# Patient Record
Sex: MALE | Race: WHITE
[De-identification: names, ages, dates, MRNs, and addresses within clinical notes are randomized per-mention and may not be internally consistent; named-entity substitution may affect disease eponyms.]

---

## 2019-08-05 ENCOUNTER — HOSPITAL ENCOUNTER (INPATIENT)
Dept: HOSPITAL 72 - EMR | Age: 44
LOS: 1 days | Discharge: HOME | DRG: 342 | End: 2019-08-06
Payer: COMMERCIAL

## 2019-08-05 VITALS — BODY MASS INDEX: 32.07 KG/M2 | HEIGHT: 70 IN | WEIGHT: 224 LBS

## 2019-08-05 VITALS — DIASTOLIC BLOOD PRESSURE: 63 MMHG | SYSTOLIC BLOOD PRESSURE: 113 MMHG

## 2019-08-05 VITALS — SYSTOLIC BLOOD PRESSURE: 111 MMHG | DIASTOLIC BLOOD PRESSURE: 62 MMHG

## 2019-08-05 VITALS — DIASTOLIC BLOOD PRESSURE: 72 MMHG | SYSTOLIC BLOOD PRESSURE: 131 MMHG

## 2019-08-05 VITALS — DIASTOLIC BLOOD PRESSURE: 72 MMHG | SYSTOLIC BLOOD PRESSURE: 125 MMHG

## 2019-08-05 VITALS — SYSTOLIC BLOOD PRESSURE: 118 MMHG | DIASTOLIC BLOOD PRESSURE: 67 MMHG

## 2019-08-05 VITALS — DIASTOLIC BLOOD PRESSURE: 78 MMHG | SYSTOLIC BLOOD PRESSURE: 126 MMHG

## 2019-08-05 VITALS — DIASTOLIC BLOOD PRESSURE: 70 MMHG | SYSTOLIC BLOOD PRESSURE: 117 MMHG

## 2019-08-05 VITALS — DIASTOLIC BLOOD PRESSURE: 69 MMHG | SYSTOLIC BLOOD PRESSURE: 124 MMHG

## 2019-08-05 DIAGNOSIS — N18.9: ICD-10-CM

## 2019-08-05 DIAGNOSIS — K35.891: Primary | ICD-10-CM

## 2019-08-05 DIAGNOSIS — E78.5: ICD-10-CM

## 2019-08-05 DIAGNOSIS — I12.9: ICD-10-CM

## 2019-08-05 DIAGNOSIS — K42.0: ICD-10-CM

## 2019-08-05 LAB
ADD MANUAL DIFF: YES
ALBUMIN SERPL-MCNC: 3.9 G/DL (ref 3.4–5)
ALBUMIN/GLOB SERPL: 0.9 {RATIO} (ref 1–2.7)
ALP SERPL-CCNC: 85 U/L (ref 46–116)
ALT SERPL-CCNC: 29 U/L (ref 12–78)
ANION GAP SERPL CALC-SCNC: 10 MMOL/L (ref 5–15)
APPEARANCE UR: CLEAR
APTT BLD: 30 SEC (ref 23–33)
APTT PPP: YELLOW S
AST SERPL-CCNC: 23 U/L (ref 15–37)
BILIRUB DIRECT SERPL-MCNC: 0.2 MG/DL (ref 0–0.3)
BILIRUB SERPL-MCNC: 1.2 MG/DL (ref 0.2–1)
BUN SERPL-MCNC: 15 MG/DL (ref 7–18)
CALCIUM SERPL-MCNC: 9.5 MG/DL (ref 8.5–10.1)
CHLORIDE SERPL-SCNC: 100 MMOL/L (ref 98–107)
CO2 SERPL-SCNC: 30 MMOL/L (ref 21–32)
CREAT SERPL-MCNC: 1.6 MG/DL (ref 0.55–1.3)
ERYTHROCYTE [DISTWIDTH] IN BLOOD BY AUTOMATED COUNT: 11.4 % (ref 11.6–14.8)
GLOBULIN SER-MCNC: 4.2 G/DL
GLUCOSE UR STRIP-MCNC: NEGATIVE MG/DL
HCT VFR BLD CALC: 47.9 % (ref 42–52)
HGB BLD-MCNC: 16.2 G/DL (ref 14.2–18)
INR PPP: 1 (ref 0.9–1.1)
KETONES UR QL STRIP: NEGATIVE
LEUKOCYTE ESTERASE UR QL STRIP: NEGATIVE
MCV RBC AUTO: 91 FL (ref 80–99)
NITRITE UR QL STRIP: NEGATIVE
PH UR STRIP: 5 [PH] (ref 4.5–8)
PLATELET # BLD: 237 K/UL (ref 150–450)
POTASSIUM SERPL-SCNC: 3.8 MMOL/L (ref 3.5–5.1)
PROT UR QL STRIP: (no result)
RBC # BLD AUTO: 5.26 M/UL (ref 4.7–6.1)
SODIUM SERPL-SCNC: 140 MMOL/L (ref 136–145)
SP GR UR STRIP: 1.02 (ref 1–1.03)
UROBILINOGEN UR-MCNC: 1 MG/DL (ref 0–1)
WBC # BLD AUTO: 24.3 K/UL (ref 4.8–10.8)

## 2019-08-05 PROCEDURE — 80048 BASIC METABOLIC PNL TOTAL CA: CPT

## 2019-08-05 PROCEDURE — 85007 BL SMEAR W/DIFF WBC COUNT: CPT

## 2019-08-05 PROCEDURE — 81003 URINALYSIS AUTO W/O SCOPE: CPT

## 2019-08-05 PROCEDURE — 96375 TX/PRO/DX INJ NEW DRUG ADDON: CPT

## 2019-08-05 PROCEDURE — 86900 BLOOD TYPING SEROLOGIC ABO: CPT

## 2019-08-05 PROCEDURE — 96365 THER/PROPH/DIAG IV INF INIT: CPT

## 2019-08-05 PROCEDURE — 82248 BILIRUBIN DIRECT: CPT

## 2019-08-05 PROCEDURE — 94150 VITAL CAPACITY TEST: CPT

## 2019-08-05 PROCEDURE — 80053 COMPREHEN METABOLIC PANEL: CPT

## 2019-08-05 PROCEDURE — 99285 EMERGENCY DEPT VISIT HI MDM: CPT

## 2019-08-05 PROCEDURE — 86850 RBC ANTIBODY SCREEN: CPT

## 2019-08-05 PROCEDURE — 83690 ASSAY OF LIPASE: CPT

## 2019-08-05 PROCEDURE — 84484 ASSAY OF TROPONIN QUANT: CPT

## 2019-08-05 PROCEDURE — 74177 CT ABD & PELVIS W/CONTRAST: CPT

## 2019-08-05 PROCEDURE — 36415 COLL VENOUS BLD VENIPUNCTURE: CPT

## 2019-08-05 PROCEDURE — 85025 COMPLETE CBC W/AUTO DIFF WBC: CPT

## 2019-08-05 PROCEDURE — 86901 BLOOD TYPING SEROLOGIC RH(D): CPT

## 2019-08-05 PROCEDURE — 85610 PROTHROMBIN TIME: CPT

## 2019-08-05 PROCEDURE — 85730 THROMBOPLASTIN TIME PARTIAL: CPT

## 2019-08-05 PROCEDURE — 93005 ELECTROCARDIOGRAM TRACING: CPT

## 2019-08-05 PROCEDURE — 94003 VENT MGMT INPAT SUBQ DAY: CPT

## 2019-08-05 RX ADMIN — DEXTROSE MONOHYDRATE SCH MLS/HR: 50 INJECTION, SOLUTION INTRAVENOUS at 23:52

## 2019-08-05 NOTE — NUR
ED Nurse Note:



pt stated he takes BP meds and HLD meds but does not recall the names and 
dosages.

## 2019-08-05 NOTE — CONSULTATION
History of Present Illness


General


Date patient seen:  Aug 5, 2019


Reason for Hospitalization:  Abdominal Pain





Present Illness


HPI


This is a very pleasant 43-year-old male who presented to the emerge department 

[facility] complaining of worsening abdominal pain.  Patient states 

that approximately 2 days ago he began to develop some acute lower abdominal 

pain.  Initially thought it could potentially be kidney stones as he has had 

them in the past and tried home remedy with pain medication but pain progressed 

and continued to worsen and localized in the right lower quadrant.  Pain 

associated with nausea and nonbilious nonbloody emesis.  Pain described as 10 

out of 10 out of max sharp right lower quadrant pain which is consistently 

approximately 6 unless movement or palpation making it worse.  In emergency 

department identified to have a leukocytosis.  CT scan consistent with acute 

appendicitis.  Surgery called to evaluate.  Patient seen, patient [place], chart 

reviewed.


Allergies:  


Coded Allergies:  


     No Known Allergies (Unverified , 8/5/19)





Patient History


History Provided By:  Patient, Medical Record, PMD


Healthcare decision maker





Resuscitation status





Advanced Directive on File








Past Medical/Surgical History


Past Medical/Surgical History:  


(1) Acute abdominal pain


(2) Renal cyst


(3) Appendicitis, acute


(4) Renal stones


(5) Left inguinal hernia





Review of Systems


Review of Symptoms


General ROS: no weight loss or fever


Psychological ROS: no depression or mood changes, no memory loss


Ophthalmic ROS: no visual changes or eye irritation


ENT ROS: no nasal congestion, hearing loss, dizziness


Allergy and Immunology ROS: no allergic symptoms or urticaria


Hematological and Lymphatic ROS: no swollen glands, unusual bleeding or bruising


Endocrine ROS: no polyuria, polydipsia, weight changes, temperature intolerance


Respiratory ROS: no cough, shortness of breath, or wheezing


Cardiovascular ROS: no chest pain or dyspnea on exertion


Gastrointestinal ROS: abdominal pain, no bright red blood in stool.


Musculoskeletal ROS: no myalgias or arthralgias


Neurological ROS: no TIA or stroke symptoms


Dermatological ROS: no new or changing skin lesions, rashes or pruritis





Physical Exam


Physical Exam


General appearance:  alert, cooperative, no distress, appears stated age


Head:  Normocephalic, without obvious abnormality, atraumatic


Eyes:  conjunctivae/corneas clear. PERRL, EOM's intact. Fundi benign


Throat:  Lips, mucosa, and tongue normal. Teeth and gums normal


Neck:  supple, symmetrical, trachea midline, no adenopathy, thyroid: not 

enlarged, symmetric, no tenderness/mass/nodules, no carotid bruit and no JVD


Lungs:  clear to auscultation bilaterally


Heart:  regular rate and rhythm, S1, S2 normal, no murmur, click, rub or gallop


Abdomen:  soft, RLQ-tender. Bowel sounds normal. No masses,  no organomegaly; 

ventral umbilical hernia with incarcerated fat


Extremities:  extremities normal, atraumatic, no cyanosis or edema


Pulses:  2+ and symmetric


Skin:  Skin color, texture, turgor normal. No rashes or lesions


Neurologic:  Grossly normal





Last 24 Hour Vital Signs








  Date Time  Temp Pulse Resp B/P (MAP) Pulse Ox O2 Delivery O2 Flow Rate FiO2


 


8/5/19 15:27 98.8       


 


8/5/19 14:55 98.8       


 


8/5/19 13:37 98.8 81 20 126/78 98 Room Air  


 


8/5/19 13:37  81 20   Room Air  


 


8/5/19 13:22 98.8 81 20 126/78 (94) 98 Room Air  











Laboratory Tests








Test


  8/5/19


13:40 8/5/19


14:00


 


Urine Color Yellow   


 


Urine Appearance Clear   


 


Urine pH 5 (4.5-8.0)   


 


Urine Specific Gravity


  1.020


(1.005-1.035) 


 


 


Urine Protein


  1+ (NEGATIVE)


H 


 


 


Urine Glucose (UA)


  Negative


(NEGATIVE) 


 


 


Urine Ketones


  Negative


(NEGATIVE) 


 


 


Urine Blood


  Negative


(NEGATIVE) 


 


 


Urine Nitrite


  Negative


(NEGATIVE) 


 


 


Urine Bilirubin


  Negative


(NEGATIVE) 


 


 


Urine Urobilinogen


  1 MG/DL


(0.0-1.0)  H 


 


 


Urine Leukocyte Esterase


  Negative


(NEGATIVE) 


 


 


Urine RBC


  0 /HPF (0 - 0)


  


 


 


Urine WBC


  0-2 /HPF (0 -


0) 


 


 


Urine Squamous Epithelial


Cells Occasional


/LPF 


 


 


Urine Amorphous Sediment


  Few /LPF


(NONE)  H 


 


 


Urine Bacteria


  Occasional


/HPF (NONE) 


 


 


Urine Mucus


  Few /LPF


(NONE/OCC)  H 


 


 


White Blood Count


  


  24.3 K/UL


(4.8-10.8)  *H


 


Red Blood Count


  


  5.26 M/UL


(4.70-6.10)


 


Hemoglobin


  


  16.2 G/DL


(14.2-18.0)


 


Hematocrit


  


  47.9 %


(42.0-52.0)


 


Mean Corpuscular Volume  91 FL (80-99)  


 


Mean Corpuscular Hemoglobin


  


  30.7 PG


(27.0-31.0)


 


Mean Corpuscular Hemoglobin


Concent 


  33.7 G/DL


(32.0-36.0)


 


Red Cell Distribution Width


  


  11.4 %


(11.6-14.8)  L


 


Platelet Count


  


  237 K/UL


(150-450)


 


Mean Platelet Volume


  


  8.1 FL


(6.5-10.1)


 


Neutrophils (%) (Auto)


  


  % (45.0-75.0)


 


 


Lymphocytes (%) (Auto)


  


  % (20.0-45.0)


 


 


Monocytes (%) (Auto)   % (1.0-10.0)  


 


Eosinophils (%) (Auto)   % (0.0-3.0)  


 


Basophils (%) (Auto)   % (0.0-2.0)  


 


Differential Total Cells


Counted 


  100  


 


 


Neutrophils % (Manual)  92 % (45-75)  H


 


Lymphocytes % (Manual)  4 % (20-45)  L


 


Monocytes % (Manual)  4 % (1-10)  


 


Eosinophils % (Manual)  0 % (0-3)  


 


Basophils % (Manual)  0 % (0-2)  


 


Band Neutrophils  0 % (0-8)  


 


Platelet Estimate  Adequate  


 


Platelet Morphology  Normal  


 


Red Blood Cell Morphology  Normal  


 


Prothrombin Time


  


  10.6 SEC


(9.30-11.50)


 


Prothromb Time International


Ratio 


  1.0 (0.9-1.1)  


 


 


Activated Partial


Thromboplast Time 


  30 SEC (23-33)


 


 


Sodium Level


  


  140 MMOL/L


(136-145)


 


Potassium Level


  


  3.8 MMOL/L


(3.5-5.1)


 


Chloride Level


  


  100 MMOL/L


()


 


Carbon Dioxide Level


  


  30 MMOL/L


(21-32)


 


Anion Gap


  


  10 mmol/L


(5-15)


 


Blood Urea Nitrogen


  


  15 mg/dL


(7-18)


 


Creatinine


  


  1.6 MG/DL


(0.55-1.30)  H


 


Estimat Glomerular Filtration


Rate 


  47.2 mL/min


(>60)


 


Glucose Level


  


  97 MG/DL


()


 


Calcium Level


  


  9.5 MG/DL


(8.5-10.1)


 


Total Bilirubin


  


  1.2 MG/DL


(0.2-1.0)  H


 


Direct Bilirubin


  


  0.2 MG/DL


(0.0-0.3)


 


Aspartate Amino Transf


(AST/SGOT) 


  23 U/L (15-37)


 


 


Alanine Aminotransferase


(ALT/SGPT) 


  29 U/L (12-78)


 


 


Alkaline Phosphatase


  


  85 U/L


()


 


Troponin I


  


  0.000 ng/mL


(0.000-0.056)


 


Total Protein


  


  8.1 G/DL


(6.4-8.2)


 


Albumin


  


  3.9 G/DL


(3.4-5.0)


 


Globulin  4.2 g/dL  


 


Albumin/Globulin Ratio


  


  0.9 (1.0-2.7)


L


 


Lipase


  


  119 U/L


()








Height (Feet):  5


Height (Inches):  10.00


Weight (Pounds):  224


Medications





Current Medications








 Medications


  (Trade)  Dose


 Ordered  Sig/Catherine


 Route


 PRN Reason  Start Time


 Stop Time Status Last Admin


Dose Admin


 


 Iopamidol


  (Isovue-300


 100ml)  100 ml  NOW  PRN


 INJ


 Radiology Procedure  8/5/19 13:45


     


 











Assessment/Plan


Problem List:  


(1) Ventral hernia


ICD Codes:  K43.9 - Ventral hernia without obstruction or gangrene


SNOMED:  073520329


(2) Acute abdominal pain


Assessment & Plan:  Findings: There is evidence of acute appendicitis with 

dilatation of the mid to


distal portion of the appendix with ill-defined wall and periappendiceal


inflammation. There is no abscess. There is no free fluid. The appendix measures


about 13 to 14 mm in diameter.


ICD Codes:  R10.9 - Unspecified abdominal pain


SNOMED:  769197890


(3) Appendicitis, acute


Assessment & Plan:  This is a 43-year-old male with acute uncomplicated 

appendicitis.  Afebrile, hemodynamically stable, leukocytosis, CT scan 

consistent with acute appendicitis nonperforated.  On examination patient with 

focal right lower quadrant tenderness and rebound guarding.  Furthermore on 

examination patient with a ventral umbilical hernia that has incarcerated fat 

identified.  Ventral hernia otherwise asymptomatic.  Surgery indicating 

recommended.  Risk medicine alternatives surgery discussed patient in detail 

questions and consented.  In discussion with patient about operative plan 

decision was made to utilize the ventral hernia as a lap scopic port site and 

an closure repair of the hernia without mesh.  Patient expressed understanding 

and consented to both procedures.





N.p.o.


IV fluids


IV Abx


Consent


to OR for lap vs open appy


ICD Codes:  K35.80 - Unspecified acute appendicitis


SNOMED:  99763485











Zane Rose Aug 5, 2019 17:16

## 2019-08-05 NOTE — NUR
NURSE NOTES:



Received handoff report from PACU RN. Patient is calm and resting comfortably, VSS on 2L O2 
NS. Ventral abdominal steri stips C/D/I. Abdomen soft and round with hypoactive bowel sounds 
in all 4 quadrants. Lungs clear bilaterally. Patient denies SOB. Peripheral IV intact and 
patent.

## 2019-08-05 NOTE — DIAGNOSTIC IMAGING REPORT
Indication: Abdominal pain in the right lower quadrant.

 

Technique: Continuous helical transaxial imaging of the abdomen and pelvis was

obtained from the lung bases to the pubic symphysis during intravenous contrast

administration. Coronal 2-D reformats were also obtained. Study obtained in a Siemens

sensation 64 slice CT.  Automatic Exposure Control was utilized.

 

Total Dose length Product (DLP):  1007.52 mGycm

 

CT Dose Index Volume (CTDIvol):   18.4 mGy

 

Comparison: None

 

Findings: There is evidence of acute appendicitis with dilatation of the mid to

distal portion of the appendix with ill-defined wall and periappendiceal

inflammation. There is no abscess. There is no free fluid. The appendix measures

about 13 to 14 mm in diameter. Bowel gas pattern is nonobstructive. There is a small

left inguinal hernia containing fat. There is no hydronephrosis demonstrated but

there are a few punctate nonobstructive stones within both kidneys. Hypodensities

within the kidneys also noted may be small cysts. Some are too small to characterize

adequately on this examination. Lung bases are clear. The liver, gallbladder,

pancreas and spleen appear unremarkable. There is no adrenal mass.

 

IMPRESSION:

 

Acute appendicitis. No evidence of rupture or abscess.

 

Nonobstructive tiny stones within both kidneys.

 

Suggestion of tiny bilateral renal cysts. Some are too small to characterize

 

Small left inguinal hernia containing fat.

 

Critical value communication.  Findings were discussed via telephone with Dr. Zhang

in the emergency department at 3:35 PM, 8/5/2019.

 

 

 

 

The CT scanner at [facility] is accredited by the American College of

Radiology and the scans are performed using dose optimization techniques as

appropriate to a performed exam including Automatic Exposure control.

## 2019-08-05 NOTE — ANETHESIA PREOPERATIVE EVAL
Anesthesia Pre-op PMH/ROS


General


Date of Evaluation:  Aug 5, 2019


Time of Evaluation:  18:25


Anesthesiologist:  jalen


ASA Score:  ASA 2


Mallampati Score


Class I : Soft palate, uvula, fauces, pillars visible


Class II: Soft palate, uvula, fauces visible


Class III: Soft palate, base of uvula visible


Class IV: Only hard plate visible


Mallampati Classification:  Class III


Surgeon:  medhat


Diagnosis:  appendictis


Surgical Procedure:  lap appy


Social History:  smoking


Family History:  no anesthesia problems


Allergies:  


Coded Allergies:  


     No Known Allergies (Unverified , 8/5/19)


Medications:  see eMAR


Patient NPO?:  Yes


NPO Date:  Aug 5, 2019


NPO Time:  00:01





Past Medical History


Cardiovascular:  Denies: HTN, CAD, MI, valve dz, arrhythmia, other


Pulmonary:  Denies: asthma, COPD, MIKAEL, other


Gastrointestinal/Genitourinary:  Reports: GERD


Neurologic/Psychiatric:  Denies: dementia, CVA, depression/anxiety, TIA, other


HEENT:  Denies: cataract (L), cataract (R), glaucoma, [place] (L), [place] (R), other


Hematology/Immune:  Denies: anemia, DVT, bleeding disorder, other


Musculoskeletal/Integumentary:  Denies: OA, RA, DJD, DDD, edema, other





Anesthesia Pre-op Phys. Exam


Physician Exam





Last Vital Signs








  Date Time  Temp Pulse Resp B/P (MAP) Pulse Ox O2 Delivery O2 Flow Rate FiO2


 


8/5/19 18:06 98.8 78 20 122/74 98 Room Air  








Constitutional:  NAD


Neurologic:  CN 2-12 intact


Cardiovascular:  RRR


Respiratory:  CTA


Gastrointestinal:  S/NT/ND





Airway Exam


Mallampati Score:  Class III


MO:  full


Neck:  thick


ROM:  full


Dentures:  no upper, no lower





Anesthesia Pre-op A/P


Labs





Hematology








Test


  8/5/19


14:00


 


White Blood Count


  24.3 K/UL


(4.8-10.8)  *H


 


Red Blood Count


  5.26 M/UL


(4.70-6.10)


 


Hemoglobin


  16.2 G/DL


(14.2-18.0)


 


Hematocrit


  47.9 %


(42.0-52.0)


 


Mean Corpuscular Volume 91 FL (80-99)  


 


Mean Corpuscular Hemoglobin


  30.7 PG


(27.0-31.0)


 


Mean Corpuscular Hemoglobin


Concent 33.7 G/DL


(32.0-36.0)


 


Red Cell Distribution Width


  11.4 %


(11.6-14.8)  L


 


Platelet Count


  237 K/UL


(150-450)


 


Mean Platelet Volume


  8.1 FL


(6.5-10.1)


 


Neutrophils (%) (Auto)


  % (45.0-75.0)


 


 


Lymphocytes (%) (Auto)


  % (20.0-45.0)


 


 


Monocytes (%) (Auto)  % (1.0-10.0)  


 


Eosinophils (%) (Auto)  % (0.0-3.0)  


 


Basophils (%) (Auto)  % (0.0-2.0)  


 


Differential Total Cells


Counted 100  


 


 


Neutrophils % (Manual) 92 % (45-75)  H


 


Lymphocytes % (Manual) 4 % (20-45)  L


 


Monocytes % (Manual) 4 % (1-10)  


 


Eosinophils % (Manual) 0 % (0-3)  


 


Basophils % (Manual) 0 % (0-2)  


 


Band Neutrophils 0 % (0-8)  


 


Platelet Estimate Adequate  


 


Platelet Morphology Normal  


 


Red Blood Cell Morphology Normal  








Coagulation








Test


  8/5/19


14:00


 


Prothrombin Time


  10.6 SEC


(9.30-11.50)


 


Prothromb Time International


Ratio 1.0 (0.9-1.1)  


 


 


Activated Partial


Thromboplast Time 30 SEC (23-33)


 








Chemistry








Test


  8/5/19


14:00


 


Sodium Level


  140 MMOL/L


(136-145)


 


Potassium Level


  3.8 MMOL/L


(3.5-5.1)


 


Chloride Level


  100 MMOL/L


()


 


Carbon Dioxide Level


  30 MMOL/L


(21-32)


 


Anion Gap


  10 mmol/L


(5-15)


 


Blood Urea Nitrogen


  15 mg/dL


(7-18)


 


Creatinine


  1.6 MG/DL


(0.55-1.30)  H


 


Estimat Glomerular Filtration


Rate 47.2 mL/min


(>60)


 


Glucose Level


  97 MG/DL


()


 


Calcium Level


  9.5 MG/DL


(8.5-10.1)


 


Total Bilirubin


  1.2 MG/DL


(0.2-1.0)  H


 


Direct Bilirubin


  0.2 MG/DL


(0.0-0.3)


 


Aspartate Amino Transf


(AST/SGOT) 23 U/L (15-37)


 


 


Alanine Aminotransferase


(ALT/SGPT) 29 U/L (12-78)


 


 


Alkaline Phosphatase


  85 U/L


()


 


Troponin I


  0.000 ng/mL


(0.000-0.056)


 


Total Protein


  8.1 G/DL


(6.4-8.2)


 


Albumin


  3.9 G/DL


(3.4-5.0)


 


Globulin 4.2 g/dL  


 


Albumin/Globulin Ratio


  0.9 (1.0-2.7)


L


 


Lipase


  119 U/L


()











Risk Assessment & Plan


Assessment:


denies changes in health


Plan:


general


Status Change Before Surgery:  No





Pre-Antibiotics


Drug:  none











Tatiana Inman CRNA Aug 5, 2019 19:29

## 2019-08-05 NOTE — BRIEF OPERATIVE NOTE
Immediate Post Operative Note


Operative Note


Pre-op Diagnosis:


acute appendicitis


fat incarcerated ventral umbilical hernia


Procedure:


lap appy 


ventral hernia repair


Post-op Diagnosis:  same as pre-op


Surgeon:  devendra


Anesthesiologist:  cosme


Anesthesia:  general, local


Specimen:  yes


Complications:  none


Condition:  stable


Fluids:  see records


Estimated Blood Loss:  minimal


Drains:  none


Implant(s) used?:  No











Zane Rose Aug 5, 2019 19:23

## 2019-08-05 NOTE — PRE-PROCEDURE NOTE/ATTESTATION
Pre-Procedure Note/Attestation


Complete Prior to Procedure


Planned Procedure:  not applicable


Procedure Narrative:


1. laparoscopic appendectomy possible open


2. ventral umbilical hernia repair





Indications for Procedure


Pre-Operative Diagnosis:


acute appendicitis


fat incarcerated ventral umbilical hernia





Attestation


I attest that I discussed the nature of the procedure; its benefits; risks and 

complications; and alternatives (and the risks and benefits of such alternatives

), prior to the procedure, with the patient (or the patient's legal 

representative).





I attest that, if there was a reasonable possibility of needing a blood 

transfusion, the patient (or the patient's legal representative) was given the 

[place] [employer] standardized written summary, pursuant 

to the [place] Blood Safety Act ([place] Health and Safety Code # 1645, as 

amended).





I attest that I re-evaluated the patient just prior to the surgery and that 

there has been no change in the patient's H&P, except as documented below:











Zane Rose Aug 5, 2019 17:20

## 2019-08-05 NOTE — NUR
ED Nurse Note:



Patient walked in to ER c/o abdominal pain 10/10, N/V for 4 days. Patient alert 
and oriented x4 and ambulatory. skin clean and intact. calm and cooperative. pt 
is standing at this moment due to sitting makes pain worse. pt is in gown and 
on cardiac monitor.

## 2019-08-05 NOTE — IMMEDIATE POST-OP EVALUATION
Immediate Post-Op Evalulation


Immediate Post-Op Evalulation


Procedure:  LapAppendectomy


Date of Evaluation:  Aug 5, 2019


Time of Evaluation:  19:27


IV Fluids:  1000


Blood Pressure Systolic:  131


Blood Pressure Diastolic:  72


Pulse Rate:  69


Respiratory Rate:  14


O2 Sat by Pulse Oximetry:  98


Nausea:  No


Vomiting:  No


Complications


none


Patient Status:  awake, reacts, patent


Hydration Status:  adequate


Drug:  declined











Tatiana Inman CRNA Aug 5, 2019 19:28

## 2019-08-05 NOTE — EMERGENCY ROOM REPORT
History of Present Illness


General


Chief Complaint:  Abdominal Pain


Source:  Patient





Present Illness


HPI


Patient is a 43-year-old male presented after increased abdominal pain for 1 

day. Started sunday morning.    Patient reports of increased epigastric pain 

from both flanks.  He reports having some belt-like distribution.   patient was 

noted to have increased pain to the lower abdomen as well as the upper abdomen.

  He denies any alcohol use.  He reports having prior history of kidney stones.

  He denies any fever.  He does report having some episodes of vomiting.  He 

denies any diarrhea.  Reports having somewhat become shaky.He denies regular 

alcohol or marijuana use.  He denies prior surgery.  He does report his urine 

becoming more dark lately.Patient denies any hematuria.  He reports having 

normal bowel movements.Patient was noted to have last had water just prior to 

arrival.  Patient's last meal was at 1030 this morning.


Allergies:  


Coded Allergies:  


     No Known Allergies (Unverified , 8/5/19)





Patient History


Past Medical History:  see triage record, other - kidney stones


Reviewed Nursing Documentation:  PMH: Agreed; PSxH: Agreed





Nursing Documentation-PMH


Past Medical History:  No History, Except For


Hx Hypertension:  Yes





Review of Systems


All Other Systems:  negative except mentioned in HPI





Physical Exam





Vital Signs








  Date Time  Temp Pulse Resp B/P (MAP) Pulse Ox O2 Delivery O2 Flow Rate FiO2


 


8/5/19 13:22 98.8 81 20 126/78 (94) 98 Room Air  








Sp02 EP Interpretation:  reviewed, normal


General Appearance:  normal inspection, well appearing, no apparent distress, 

alert, GCS 15


Head:  atraumatic


ENT:  normal ENT inspection, hearing grossly normal, normal voice


Neck:  normal inspection, full range of motion, supple, no bony tend


Respiratory:  normal inspection, lungs clear, normal breath sounds, no 

respiratory distress, no retraction, no wheezing


Cardiovascular #1:  regular rate, rhythm, no edema


Gastrointestinal:  soft, tenderness - right lower abdomen


Genitourinary:  no CVA tenderness


Musculoskeletal:  normal inspection, back normal, normal range of motion


Neurologic:  normal inspection, alert, oriented x3, responsive, CNs III-XII nml 

as tested, speech normal


Psychiatric:  normal inspection, judgement/insight normal, mood/affect normal





Medical Decision Making


Diagnostic Impression:  


 Primary Impression:  


 Acute abdominal pain


 Additional Impressions:  


 Appendicitis, acute


 Renal stones


 Left inguinal hernia


 Renal cyst


ER Course


Patient presented for abdominal pain. Differential diagnoses included ischemic 

bowel, appendicitis, perforated viscus, abdominal aortic aneurysm, inferior 

myocardial infarction, viral gastroenteritis among others.  Because of 

complexity of patient's case laboratory testing and imaging studies were 

ordered.  Patient was noted to have some prior history of kidney stones.  

Patient's pain appears to be more localized to the right lower abdomen.  

Patient was noted to have some focal tenderness in that area.  CT of the 

abdomen pelvis was ordered due to the patient's increased pain.  Patient 

started on IV fluids.  Patient was noted to have elevated white blood count 24,

000.Patient's creatinine was also somewhat elevated.CT the abdomen pelvis read 

by radiology showed multiple nonobstructing renal stones as well as a 13 mm 

appendix with some mild periappendiceal inflammation consistent with 

appendicitis.Left inguinal hernia containing fat only.  Patient was endorsed to 

Dr. Jordan pending insurance authorization and final disposition.





Labs








Test


  8/5/19


13:40 8/5/19


14:00


 


Urine Color Yellow  


 


Urine Appearance Clear  


 


Urine pH 5 (4.5-8.0)  


 


Urine Specific Gravity


  1.020


(1.005-1.035) 


 


 


Urine Protein 1+ (NEGATIVE)  


 


Urine Glucose (UA)


  Negative


(NEGATIVE) 


 


 


Urine Ketones


  Negative


(NEGATIVE) 


 


 


Urine Blood


  Negative


(NEGATIVE) 


 


 


Urine Nitrite


  Negative


(NEGATIVE) 


 


 


Urine Bilirubin


  Negative


(NEGATIVE) 


 


 


Urine Urobilinogen


  1 MG/DL


(0.0-1.0) 


 


 


Urine Leukocyte Esterase


  Negative


(NEGATIVE) 


 


 


Urine RBC 0 /HPF (0 - 0)  


 


Urine WBC


  0-2 /HPF (0 -


0) 


 


 


Urine Squamous Epithelial


Cells Occasional


/LPF 


 


 


Urine Amorphous Sediment


  Few /LPF


(NONE) 


 


 


Urine Bacteria


  Occasional


/HPF (NONE) 


 


 


Urine Mucus


  Few /LPF


(NONE/OCC) 


 


 


White Blood Count


  


  24.3 K/UL


(4.8-10.8)


 


Red Blood Count


  


  5.26 M/UL


(4.70-6.10)


 


Hemoglobin


  


  16.2 G/DL


(14.2-18.0)


 


Hematocrit


  


  47.9 %


(42.0-52.0)


 


Mean Corpuscular Volume  91 FL (80-99) 


 


Mean Corpuscular Hemoglobin


  


  30.7 PG


(27.0-31.0)


 


Mean Corpuscular Hemoglobin


Concent 


  33.7 G/DL


(32.0-36.0)


 


Red Cell Distribution Width


  


  11.4 %


(11.6-14.8)


 


Platelet Count


  


  237 K/UL


(150-450)


 


Mean Platelet Volume


  


  8.1 FL


(6.5-10.1)


 


Neutrophils (%) (Auto)   % (45.0-75.0) 


 


Lymphocytes (%) (Auto)   % (20.0-45.0) 


 


Monocytes (%) (Auto)   % (1.0-10.0) 


 


Eosinophils (%) (Auto)   % (0.0-3.0) 


 


Basophils (%) (Auto)   % (0.0-2.0) 


 


Differential Total Cells


Counted 


  100 


 


 


Neutrophils % (Manual)  92 % (45-75) 


 


Lymphocytes % (Manual)  4 % (20-45) 


 


Monocytes % (Manual)  4 % (1-10) 


 


Eosinophils % (Manual)  0 % (0-3) 


 


Basophils % (Manual)  0 % (0-2) 


 


Band Neutrophils  0 % (0-8) 


 


Platelet Estimate  Adequate 


 


Platelet Morphology  Normal 


 


Red Blood Cell Morphology  Normal 


 


Prothrombin Time


  


  10.6 SEC


(9.30-11.50)


 


Prothromb Time International


Ratio 


  1.0 (0.9-1.1) 


 


 


Activated Partial


Thromboplast Time 


  30 SEC (23-33) 


 


 


Sodium Level


  


  140 MMOL/L


(136-145)


 


Potassium Level


  


  3.8 MMOL/L


(3.5-5.1)


 


Chloride Level


  


  100 MMOL/L


()


 


Carbon Dioxide Level


  


  30 MMOL/L


(21-32)


 


Anion Gap


  


  10 mmol/L


(5-15)


 


Blood Urea Nitrogen


  


  15 mg/dL


(7-18)


 


Creatinine


  


  1.6 MG/DL


(0.55-1.30)


 


Estimat Glomerular Filtration


Rate 


  47.2 mL/min


(>60)


 


Glucose Level


  


  97 MG/DL


()


 


Calcium Level


  


  9.5 MG/DL


(8.5-10.1)


 


Total Bilirubin


  


  1.2 MG/DL


(0.2-1.0)


 


Direct Bilirubin


  


  0.2 MG/DL


(0.0-0.3)


 


Aspartate Amino Transf


(AST/SGOT) 


  23 U/L (15-37) 


 


 


Alanine Aminotransferase


(ALT/SGPT) 


  29 U/L (12-78) 


 


 


Alkaline Phosphatase


  


  85 U/L


()


 


Total Protein


  


  8.1 G/DL


(6.4-8.2)


 


Albumin


  


  3.9 G/DL


(3.4-5.0)


 


Globulin  4.2 g/dL 


 


Albumin/Globulin Ratio  0.9 (1.0-2.7) 


 


Lipase


  


  119 U/L


()











Last Vital Signs








  Date Time  Temp Pulse Resp B/P (MAP) Pulse Ox O2 Delivery O2 Flow Rate FiO2


 


8/5/19 13:37 98.8 81 20 126/78 98 Room Air  








Status:  unchanged


Disposition:  ADMITTED AS INPATIENT


Condition:  Stable











Garfield Zhang MD Aug 5, 2019 13:44

## 2019-08-05 NOTE — EMERGENCY ROOM REPORT
Physical Exam





Vital Signs








  Date Time  Temp Pulse Resp B/P (MAP) Pulse Ox O2 Delivery O2 Flow Rate FiO2


 


8/5/19 13:22 98.8 81 20 126/78 (94) 98 Room Air  











Medical Decision Making


Diagnostic Impression:  


 Primary Impression:  


 Acute abdominal pain


 Additional Impressions:  


 Renal stones


 Renal cyst


 Appendicitis, acute


 Left inguinal hernia


ER Course


Assumed care from Dr. Zhang pending ultimate disposition.  Briefly, this is a 

43-year-old male who presented after 1 day of abdominal pain.  Found to have an 

acute appendicitis with significant elevation his white count.  He was given IV 

fluids, Zosyn and was made NPO and perforation for surgery.  At the time of 

signout we are awaiting ultimate disposition regarding transfer to [facility] versus keeping the patient here at our facility for surgery.  Decision 

was made to keep the patient in our facility.  Dr. Rose will take the 

patient to surgery.  Remains in stable condition and pain is controlled.





Laboratory Tests








Test


  8/5/19


13:40 8/5/19


14:00


 


Urine Color Yellow   


 


Urine Appearance Clear   


 


Urine pH 5 (4.5-8.0)   


 


Urine Specific Gravity


  1.020


(1.005-1.035) 


 


 


Urine Protein


  1+ (NEGATIVE)


H 


 


 


Urine Glucose (UA)


  Negative


(NEGATIVE) 


 


 


Urine Ketones


  Negative


(NEGATIVE) 


 


 


Urine Blood


  Negative


(NEGATIVE) 


 


 


Urine Nitrite


  Negative


(NEGATIVE) 


 


 


Urine Bilirubin


  Negative


(NEGATIVE) 


 


 


Urine Urobilinogen


  1 MG/DL


(0.0-1.0)  H 


 


 


Urine Leukocyte Esterase


  Negative


(NEGATIVE) 


 


 


Urine RBC


  0 /HPF (0 - 0)


  


 


 


Urine WBC


  0-2 /HPF (0 -


0) 


 


 


Urine Squamous Epithelial


Cells Occasional


/LPF 


 


 


Urine Amorphous Sediment


  Few /LPF


(NONE)  H 


 


 


Urine Bacteria


  Occasional


/HPF (NONE) 


 


 


Urine Mucus


  Few /LPF


(NONE/OCC)  H 


 


 


White Blood Count


  


  24.3 K/UL


(4.8-10.8)  *H


 


Red Blood Count


  


  5.26 M/UL


(4.70-6.10)


 


Hemoglobin


  


  16.2 G/DL


(14.2-18.0)


 


Hematocrit


  


  47.9 %


(42.0-52.0)


 


Mean Corpuscular Volume  91 FL (80-99)  


 


Mean Corpuscular Hemoglobin


  


  30.7 PG


(27.0-31.0)


 


Mean Corpuscular Hemoglobin


Concent 


  33.7 G/DL


(32.0-36.0)


 


Red Cell Distribution Width


  


  11.4 %


(11.6-14.8)  L


 


Platelet Count


  


  237 K/UL


(150-450)


 


Mean Platelet Volume


  


  8.1 FL


(6.5-10.1)


 


Neutrophils (%) (Auto)


  


  % (45.0-75.0)


 


 


Lymphocytes (%) (Auto)


  


  % (20.0-45.0)


 


 


Monocytes (%) (Auto)   % (1.0-10.0)  


 


Eosinophils (%) (Auto)   % (0.0-3.0)  


 


Basophils (%) (Auto)   % (0.0-2.0)  


 


Differential Total Cells


Counted 


  100  


 


 


Neutrophils % (Manual)  92 % (45-75)  H


 


Lymphocytes % (Manual)  4 % (20-45)  L


 


Monocytes % (Manual)  4 % (1-10)  


 


Eosinophils % (Manual)  0 % (0-3)  


 


Basophils % (Manual)  0 % (0-2)  


 


Band Neutrophils  0 % (0-8)  


 


Platelet Estimate  Adequate  


 


Platelet Morphology  Normal  


 


Red Blood Cell Morphology  Normal  


 


Prothrombin Time


  


  10.6 SEC


(9.30-11.50)


 


Prothromb Time International


Ratio 


  1.0 (0.9-1.1)  


 


 


Activated Partial


Thromboplast Time 


  30 SEC (23-33)


 


 


Sodium Level


  


  140 MMOL/L


(136-145)


 


Potassium Level


  


  3.8 MMOL/L


(3.5-5.1)


 


Chloride Level


  


  100 MMOL/L


()


 


Carbon Dioxide Level


  


  30 MMOL/L


(21-32)


 


Anion Gap


  


  10 mmol/L


(5-15)


 


Blood Urea Nitrogen


  


  15 mg/dL


(7-18)


 


Creatinine


  


  1.6 MG/DL


(0.55-1.30)  H


 


Estimat Glomerular Filtration


Rate 


  47.2 mL/min


(>60)


 


Glucose Level


  


  97 MG/DL


()


 


Calcium Level


  


  9.5 MG/DL


(8.5-10.1)


 


Total Bilirubin


  


  1.2 MG/DL


(0.2-1.0)  H


 


Direct Bilirubin


  


  0.2 MG/DL


(0.0-0.3)


 


Aspartate Amino Transf


(AST/SGOT) 


  23 U/L (15-37)


 


 


Alanine Aminotransferase


(ALT/SGPT) 


  29 U/L (12-78)


 


 


Alkaline Phosphatase


  


  85 U/L


()


 


Troponin I


  


  0.000 ng/mL


(0.000-0.056)


 


Total Protein


  


  8.1 G/DL


(6.4-8.2)


 


Albumin


  


  3.9 G/DL


(3.4-5.0)


 


Globulin  4.2 g/dL  


 


Albumin/Globulin Ratio


  


  0.9 (1.0-2.7)


L


 


Lipase


  


  119 U/L


()











Last Vital Signs








  Date Time  Temp Pulse Resp B/P (MAP) Pulse Ox O2 Delivery O2 Flow Rate FiO2


 


8/5/19 15:27 98.8       


 


8/5/19 13:37  81 20 126/78 98 Room Air  








Disposition:  ADMITTED AS INPATIENT


Condition:  Stable


Referrals:  


[facility],REFERRING (PCP)











Sourav Jordan MD Aug 5, 2019 17:11

## 2019-08-06 VITALS — SYSTOLIC BLOOD PRESSURE: 118 MMHG | DIASTOLIC BLOOD PRESSURE: 71 MMHG

## 2019-08-06 VITALS — SYSTOLIC BLOOD PRESSURE: 128 MMHG | DIASTOLIC BLOOD PRESSURE: 71 MMHG

## 2019-08-06 VITALS — SYSTOLIC BLOOD PRESSURE: 133 MMHG | DIASTOLIC BLOOD PRESSURE: 78 MMHG

## 2019-08-06 LAB
ADD MANUAL DIFF: YES
ANION GAP SERPL CALC-SCNC: 8 MMOL/L (ref 5–15)
BUN SERPL-MCNC: 14 MG/DL (ref 7–18)
CALCIUM SERPL-MCNC: 8.6 MG/DL (ref 8.5–10.1)
CHLORIDE SERPL-SCNC: 100 MMOL/L (ref 98–107)
CO2 SERPL-SCNC: 27 MMOL/L (ref 21–32)
CREAT SERPL-MCNC: 1.5 MG/DL (ref 0.55–1.3)
ERYTHROCYTE [DISTWIDTH] IN BLOOD BY AUTOMATED COUNT: 12 % (ref 11.6–14.8)
HCT VFR BLD CALC: 40.8 % (ref 42–52)
HGB BLD-MCNC: 14.2 G/DL (ref 14.2–18)
MCV RBC AUTO: 89 FL (ref 80–99)
PLATELET # BLD: 182 K/UL (ref 150–450)
POTASSIUM SERPL-SCNC: 3.7 MMOL/L (ref 3.5–5.1)
RBC # BLD AUTO: 4.58 M/UL (ref 4.7–6.1)
SODIUM SERPL-SCNC: 135 MMOL/L (ref 136–145)
WBC # BLD AUTO: 10.9 K/UL (ref 4.8–10.8)

## 2019-08-06 RX ADMIN — DEXTROSE MONOHYDRATE SCH MLS/HR: 50 INJECTION, SOLUTION INTRAVENOUS at 06:33

## 2019-08-06 NOTE — NUR
NURSE NOTES:





PATIENT REMAINS STABLE. SEEN BY SURGEON AND PMD. DISCHARGE ORDER RECEIVED; PATIENT AWARE.

## 2019-08-06 NOTE — OPERATIVE NOTE - DICTATED
DATE OF OPERATION:  08/05/2019

PREOPERATIVE DIAGNOSES:

1. Acute appendicitis.

2. Incarcerated ventral umbilical hernia.



POSTOPERATIVE DIAGNOSES:

1. Acute appendicitis with necrotic tip.

2. Incarcerated ventral umbilical hernia with fat.



OPERATION PERFORMED:

1. Laparoscopic appendectomy.

2. Repair of incarcerated ventral umbilical hernia.



ATTENDING SURGEON:  Zane Rose M.D.



ASSISTANT:  None.



ANESTHESIOLOGIST:  Tatiana Inman CRNA.



ANESTHESIA:  General GETA plus local.



ESTIMATED BLOOD LOSS:  Minimal.



IV FLUIDS:  Please see anesthesia records.



COMPLICATIONS:  None.



DRAINS:  None.



COUNTS:  Sponge and needle count correct x2.



SPECIMENS:  Appendix sent to pathology for review.



WOUND CLASSIFICATION:  Class 3.



IV ANTIBIOTICS:  The patient was given 3.375 g IV Zosyn 1 hour prior to cut

time.



INDICATIONS FOR PROCEDURE:  This is a 43-year-old male who presented to

[facility] Emergency Department complaining of worsening right

lower quadrant abdominal pain with associated nausea and emesis.  The

patient had a leukocytosis and CT scan consistent with acute uncomplicated

appendicitis.  Furthermore, on examination, the patient was identified to

have an incarcerated fatty ventral umbilical hernia.  Surgery was

indicated and recommended.  In discussing operative plan, decision was

made for laparoscopic versus possible open appendectomy.  Furthermore,

given the patient's incarcerated fatty umbilical hernia and the location

and size, decision was made to utilize a port site and reduce and repair

of the hernia at the same time.  Risks, benefits, and alternatives were

discussed with the patient in detail, who expressed understanding and

consented surgery.



OPERATIVE NOTE:  The patient was taken to the operating room and placed on

the operating table in supine position with left arm tucked.  All bony

prominences were well padded.  SCDs were placed.  Preoperative time-out

was taken to identify the patient, procedure, operative staff, and

surgical staff.  The patient already received IV antibiotics in the

emergency department prior to entering the operating room.  General

anesthesia was induced and the patient was intubated.  The abdomen was

clipped, prepped, and draped in standard surgical fashion.  The umbilical

ventral hernia was identified and it was noted to have incarcerated fatty

contents and not reducible.  Local anesthetic was infiltrated and midline

vertical umbilical incision was made.  Once the skin was divided, the

fatty tissue of the hernia sac was identified.  Electrocautery was used

and fascia tissue was divided at the base.  This was sent to pathology

along with the sac for review.  The abdomen was then entered under direct

visualization without complication.  A 12 mm Ruth trocar was inserted

and the abdomen was insufflated to 12 to 15 mmHg.  A 10 mm scope was

inserted and the abdomen was inspected.  No injury from initial trocar

placement and initial trocar placement noted.  The secondary trocars

placed under direct visualization beginning with a 12 mm left lower

quadrant followed by a 5 mm suprapubic.  Local anesthetic was infiltrated

throughout all skin incisions and port sites throughout the procedure for

the patient's comfort.  The patient was placed in Trendelenburg position

with the left side down.  The cecum was identified and tenia followed

until the base of the cecum was identified with the dilated distended

acute appendix.  The tip of the appendix was necrotic.  The base of the

appendix was identified and a window was made at the base of the appendix

and mesoappendix.  The laparoscopic linear stapler was used and the base

was divided without complication.  Following this, the mesoappendix was

divided in a similar fashion using a laparoscopic linear stapler.

Following this, the appendix was placed in the endoscopic retrieval bag

and removed from the left lower quadrant port site.  Inspection of the

appendiceal base and staple line, there was some oozing identified and

laparoscopic clips were used to obtain hemostasis.  Following this,

hemostasis was noted.  The appendiceal base and mesoappendix staple line

were hemostatic and intact without complication.  At this time, we began

the conclusion of our procedure.  The table was flattened out and

secondary trocars were removed under direct visualization.  The umbilical

trocar site was removed and the abdomen was desufflated.  The umbilical

trocar site fascia was freshened and all remaining contents were reduced

into the abdominal cavity into a natural position.  The ventral umbilical

hernia defect was then closed and reapproximated using multiple

figure-of-eight #0 Vicryl sutures.  Following this, skin incisions were

cleansed.  The left lower quadrant port site fascia was reapproximated

using figure-of-eight #0 Vicryl suture.  Remaining skin incisions were

reapproximated using 4-0 Monocryl subcuticular interrupted sutures.  Skin

glue and Steri-Strips were placed.  The patient tolerated the procedure

well, was extubated and taken to postanesthetic care unit in stable

condition.









  ______________________________________________

  Zane DIOMEDES Rose





DR:  KATELYN

D:  08/05/2019 19:33

T:  08/05/2019 23:26

JOB#:  [id number]

CC:

## 2019-08-06 NOTE — HISTORY AND PHYSICAL REPORT
DATE OF ADMISSION:  08/05/2019

HISTORY OF PRESENT ILLNESS:  The patient is a pleasant 43-year-old man who

came to the hospital with abdominal pain.  He was found to have acute

appendicitis and was taken to surgery last night by Dr. Rose.  Today,

he has made a smooth recovery and he is ready to go home.  I came to see

him as the hospitalist for his medical group.



PAST MEDICAL HISTORY:  Hyperlipidemia, hypertension, and kidney stones.



ALLERGIES:  None.



MEDICATIONS:  Lipitor, losartan, and hydrochlorothiazide.



SOCIAL HISTORY:  He is  and has two children.  He does not drink or

smoke.  He works in [employer]-related field.



REVIEW OF SYSTEMS:  Otherwise unremarkable.



PHYSICAL EXAMINATION:

GENERAL:  The patient is alert and responds appropriately.

VITAL SIGNS:  Show normal blood pressure, pulse, and respirations.

Temperature is 99.4 this morning.

GENERAL:  The patient is somewhat overweight.

SKIN:  Warm and dry.

HEENT:  Head is normocephalic.

NECK:  No jugular venous distention.

CHEST:  Clear.

CARDIAC:  Rhythm is regular.

ABDOMEN:  Soft with minimal tenderness.  Surgical dressings are in place.

EXTREMITIES:  No clubbing, cyanosis, or edema.



LABORATORY STUDIES:  Showed a clear urine.  White count was 24,000 and it

came down to 10,000 this morning.  Hemoglobin is normal today, but was

high on admission.  A left shift is noted.  Chemistry shows creatinine is

1.5, BUN 14.



IMPRESSION:

1. Acute appendicitis, status post laparoscopic appendectomy.

2. CKD.

3. History of kidney stones.

4. Hypertension.

5. Hyperlipidemia.



PLAN:  The patient is in satisfactory condition for discharge.  He will go

home on pain medication per Dr. Rose.  We will have him hold his

blood pressure and cholesterol medication until he sees his primary doctor

in the next week or so.









  ______________________________________________

  Luis Alfredo Cowart M.D.





DR:  CHRISTOPH

D:  08/06/2019 09:41

T:  08/06/2019 16:24

JOB#:  [id number]

CC:  Zane Rose M.D.

## 2019-08-06 NOTE — 48 HOUR POST ANESTHESIA EVAL
Post Anesthesia Evaluation


Procedure:  LapAppendectomy


Date of Evaluation:  Aug 6, 2019


Time of Evaluation:  09:50


Blood Pressure Systolic:  118


0:  74


Pulse Rate:  68


Respiratory Rate:  20


Temperature (Fahrenheit):  97.6


O2 Sat by Pulse Oximetry:  97


Airway:  patent


Nausea:  No


Vomiting:  No


Pain Intensity:  2


Hydration Status:  adequate


Cardiopulmonary Status:


stable


Mental Status/LOC:  patient returned to baseline


Follow-up Care/Observations:


n/a


Post-Anesthesia Complications:


none


Follow-up care needed:  ready to discharge











Rommel Ventura MD Aug 6, 2019 11:07

## 2019-08-06 NOTE — CDS PHYSICIAN QUERY
Clarification is required for compliance, coding accuracy, and to reflect 
severity of illness for this patient



Dear                                 Date:



Presentation: pt was diagnosed with Acute appendicitis with necrotic tip, later 
it was documented pt has CKD 



Lab: WBC 24X10     Nut:92%     Cr:1.6     Bill:1.2



VS: T:99.5    RR:20-18   OH:90   BP: 128/71



Rx: LAP APPY + Zosyn IV 



According to the clinical indications above, please indicate below the condition



PHYSICIAN RESPONSE:



 [  ]Sepsis         

 [  ]SIRS      

 [  ]SIRS with organ dysfunction

 [  ]Septic Shock       

 [ x ]Not applicable         

 [  ]Other:                      

 

 

 

Present on Admission:   [  x] Yes           [ ] No           [  ]Clinically 
Undetermined





_________________                                    _____________

Physician signature                                       Date



Please also document in your Progress Notes and/or Discharge Summary and 
indicate if the condition was present on admission.

HAIRD

## 2019-08-06 NOTE — HISTORY & PHYSICAL
History and Physical


History & Physicial


dict





dc post op appy


no BP meds or statin until eval by PMD











Luis Alfredo Cowart MD Aug 6, 2019 09:40

## 2019-08-06 NOTE — NUR
NURSE NOTES:



WALKING ROUNDS DONE WITH OUTGOING RN.PATIENT AWAKE IN BED HAVING BREAKFAST, EATING SLOWLY. 
ABDOMINAL SURGICAL SITES C/D/I. QUESTIONS ANSWERED, NEEDS MET. DISCUSSED PLAN OF CARE FOR 
THE DAY. VERBALIZED UNDERSTANDING. BED IN LOWE AND LOCKED POSITION.

## 2019-08-06 NOTE — GENERAL PROGRESS NOTE
Progress Note


Progress Note


POD #1 s/p lap appy


doing well


tolerating diet


comfortable


incisional pain


wounds c/d/i


exam stable





-rx written


-d/c home


-f/u given


thank you











Zane Rose Aug 6, 2019 10:29

## 2019-08-06 NOTE — NUR
NURSE NOTES:



DISCHARGE INSTRUCTIONS REVIEWED AND EXPLAINED TO PATIENT. RX, RETURN TO WORK NOTE AND DR. HITESH CRUZ INSTRUCTIONS PROVIDED. PATIENT ACKNOWLEDGED UNDERSTANDING. AS DISCUSSED WILL 
F/U WITH PCP TO RESUME HOME MEDS PER DR. ZHU RECOMMENDATIONS. ALL BELONGINGS WITH 
PATIENT. AMBULATORY ; GAIT STEADY.

## 2019-08-07 NOTE — CARDIOLOGY REPORT
--------------- APPROVED REPORT --------------





EKG Measurement

Heart [id number]

MN 140P35

[id number]

[id number]

[id number]





Sinus rhythm with premature supraventricular complexes and with occasional premature 

ventricular complexes

Otherwise normal ECG

## 2019-08-07 NOTE — DISCHARGE SUMMARY
Discharge Summary


Discharge Summary


_


DATE OF ADMISSION: 8/5/2019





DATE OF DISCHARGE: 8/6/2019





DISCHARGED BY: Dr Cowart 





REASON FOR ADMISSION: 


43 years old male with past medical history of hypertension, hyperlipidemia, 

kidney stones, chronic kidney disease, presented to the hospital with abdominal 

pain.


Upon evaluation vital signs were stable.  


Laboratory work-up revealed leukocytosis with WBC 24.3, stable hemoglobin and 

hematocrit.


Stable electrolytes.


BUN 15, creatinine 1.6.


Glucose 97.


Total bilirubin 1.2, direct bilirubin 0.2, stable LFT.  Lipase 119.


Urinalysis revealed +1 protein, but no evidence of urinary tract infection.  


CT of the abdomen and pelvis demonstrated acute appendicitis no evidence of 

rupture or abscess.  Also demonstrated  left inguinal hernia , containing fat.





In emergency department patient started on the IV fluids,  medicated for pain, 

received antiemetic, given empiric antibiotic and was made NPO in preparation  

for surgery.  


Surgeon seen and evaluated patient in the emergency department.


Patient appeared to be afebrile,  hemodynamically stable , with leukocytosis.  


Per surgeon, CT scan was consistent with acute nonperforated appendicitis.  


On examination patient had focal right lower quadrant tenderness and rebound 

guarding pain. 


Furthermore on examination patient noted to have ventral umbilical hernia  with

  incarcerated fat identified ,  otherwise was asymptomatic.  


Surgeon recommended surgery .


Patient consented and subsequently was taken to operating room.





CONSULTANTS:


surgery Dr. Rose


 


[facility] [place] [facility]: 


The patient subsequently undergone laparoscopic appendectomy and repair of 

incarcerated ventral umbilical hernia.


Patient tolerated procedure well.  


Postoperative course of recovery was uneventful. 


Patient was on empiric perioperative antibiotics. Leukocytosis resolved next 

day -10.9.  


Patient started on diet and was able to tolerate it.  


Pain management was addressed, and pain was controlled.  


Laparoscopic incision appeared to be clean, dry and intact.  


Patient ambulated  and voided without difficulty.  


Prescription provided by surgeon .


Patient was clear for discharge home  with outpatient follow-up with the 

surgeon as advised in the clinic.  


Blood pressure was closely monitored , remained normotensive without any 

antihypertensive medication.  


Creatinine from 1.6 down to 1.5.


Patient was stable for discharge home with outpatient follow-up with  surgeon 

and primary care provider.





Due to rapid and unexpected improvement in patient condition,  patient was 

discharged in 1 day.





FINAL DIAGNOSES: 


Acute appendicitis with necrotic tip


Incarcerated ventral umbilical hernia with fat


Status post laparoscopic appendectomy,  and repair of incarcerated ventral 

umbilical hernia


Chronic kidney disease


Hypertension


History of kidney stones


Hyperlipidemia





DISCHARGE MEDICATIONS:


See Medication Reconciliation list.





DISCHARGE INSTRUCTIONS:


Patient was discharged home.  


Follow up with primary care provider and surgeon. 





I have been assigned to dictate discharge summary for this account. 


I was not involved in the patient's management.











Kelly Silva NP Aug 7, 2019 10:37